# Patient Record
Sex: MALE | Race: WHITE
[De-identification: names, ages, dates, MRNs, and addresses within clinical notes are randomized per-mention and may not be internally consistent; named-entity substitution may affect disease eponyms.]

---

## 2018-02-28 ENCOUNTER — HOSPITAL ENCOUNTER (EMERGENCY)
Dept: HOSPITAL 17 - NEPD | Age: 23
Discharge: HOME | End: 2018-02-28
Payer: COMMERCIAL

## 2018-02-28 VITALS
DIASTOLIC BLOOD PRESSURE: 78 MMHG | RESPIRATION RATE: 16 BRPM | OXYGEN SATURATION: 100 % | SYSTOLIC BLOOD PRESSURE: 131 MMHG | HEART RATE: 85 BPM

## 2018-02-28 VITALS
DIASTOLIC BLOOD PRESSURE: 71 MMHG | TEMPERATURE: 98.9 F | HEART RATE: 72 BPM | SYSTOLIC BLOOD PRESSURE: 135 MMHG | OXYGEN SATURATION: 100 % | RESPIRATION RATE: 18 BRPM

## 2018-02-28 VITALS — WEIGHT: 220.46 LBS | BODY MASS INDEX: 34.6 KG/M2 | HEIGHT: 67 IN

## 2018-02-28 VITALS — OXYGEN SATURATION: 97 %

## 2018-02-28 DIAGNOSIS — R55: Primary | ICD-10-CM

## 2018-02-28 LAB
ALBUMIN SERPL-MCNC: 4 GM/DL (ref 3.4–5)
ALP SERPL-CCNC: 73 U/L (ref 45–117)
ALT SERPL-CCNC: 40 U/L (ref 9–52)
APAP SERPL-MCNC: (no result) MCG/ML (ref 10–30)
AST SERPL-CCNC: 30 U/L (ref 15–39)
BASOPHILS # BLD AUTO: 0 TH/MM3 (ref 0–0.2)
BASOPHILS NFR BLD: 0.1 % (ref 0–2)
BILIRUB SERPL-MCNC: 0.5 MG/DL (ref 0.2–1)
BUN SERPL-MCNC: 15 MG/DL (ref 7–18)
CALCIUM SERPL-MCNC: 9.4 MG/DL (ref 8.5–10.1)
CHLORIDE SERPL-SCNC: 107 MEQ/L (ref 98–107)
CREAT SERPL-MCNC: 1.11 MG/DL (ref 0.6–1.3)
EOSINOPHIL # BLD: 0.1 TH/MM3 (ref 0–0.4)
EOSINOPHIL NFR BLD: 0.6 % (ref 0–4)
ERYTHROCYTE [DISTWIDTH] IN BLOOD BY AUTOMATED COUNT: 12.7 % (ref 11.6–17.2)
GFR SERPLBLD BASED ON 1.73 SQ M-ARVRAT: 84 ML/MIN (ref 89–?)
GLUCOSE SERPL-MCNC: 91 MG/DL (ref 74–106)
HCO3 BLD-SCNC: 25.1 MEQ/L (ref 21–32)
HCT VFR BLD CALC: 44.8 % (ref 39–51)
HGB BLD-MCNC: 16 GM/DL (ref 13–17)
LYMPHOCYTES # BLD AUTO: 1.2 TH/MM3 (ref 1–4.8)
LYMPHOCYTES NFR BLD AUTO: 13.8 % (ref 9–44)
MCH RBC QN AUTO: 31.5 PG (ref 27–34)
MCHC RBC AUTO-ENTMCNC: 35.7 % (ref 32–36)
MCV RBC AUTO: 88.4 FL (ref 80–100)
MONOCYTE #: 0.4 TH/MM3 (ref 0–0.9)
MONOCYTES NFR BLD: 4.6 % (ref 0–8)
NEUTROPHILS # BLD AUTO: 7.1 TH/MM3 (ref 1.8–7.7)
NEUTROPHILS NFR BLD AUTO: 80.9 % (ref 16–70)
PLATELET # BLD: 208 TH/MM3 (ref 150–450)
PMV BLD AUTO: 8.1 FL (ref 7–11)
PROT SERPL-MCNC: 7.4 GM/DL (ref 6.4–8.2)
RBC # BLD AUTO: 5.07 MIL/MM3 (ref 4.5–5.9)
SODIUM SERPL-SCNC: 140 MEQ/L (ref 136–145)
TROPONIN I SERPL-MCNC: (no result) NG/ML (ref 0.02–0.05)
WBC # BLD AUTO: 8.8 TH/MM3 (ref 4–11)

## 2018-02-28 PROCEDURE — 84484 ASSAY OF TROPONIN QUANT: CPT

## 2018-02-28 PROCEDURE — 85025 COMPLETE CBC W/AUTO DIFF WBC: CPT

## 2018-02-28 PROCEDURE — 83880 ASSAY OF NATRIURETIC PEPTIDE: CPT

## 2018-02-28 PROCEDURE — 82550 ASSAY OF CK (CPK): CPT

## 2018-02-28 PROCEDURE — 70450 CT HEAD/BRAIN W/O DYE: CPT

## 2018-02-28 PROCEDURE — 80053 COMPREHEN METABOLIC PANEL: CPT

## 2018-02-28 PROCEDURE — 72131 CT LUMBAR SPINE W/O DYE: CPT

## 2018-02-28 PROCEDURE — 71045 X-RAY EXAM CHEST 1 VIEW: CPT

## 2018-02-28 PROCEDURE — 83690 ASSAY OF LIPASE: CPT

## 2018-02-28 PROCEDURE — 72125 CT NECK SPINE W/O DYE: CPT

## 2018-02-28 PROCEDURE — 80307 DRUG TEST PRSMV CHEM ANLYZR: CPT

## 2018-02-28 PROCEDURE — 82552 ASSAY OF CPK IN BLOOD: CPT

## 2018-02-28 PROCEDURE — 72128 CT CHEST SPINE W/O DYE: CPT

## 2018-02-28 PROCEDURE — 84443 ASSAY THYROID STIM HORMONE: CPT

## 2018-02-28 NOTE — RADRPT
EXAM DATE/TIME:  02/28/2018 11:29 

 

[facility] COMPARISON:     

No previous studies available for comparison.

 

 

INDICATIONS :     

Fall today,general weakness

                      

 

RADIATION DOSE:     

35.94 CTDIvol (mGy) 

 

 

 

MEDICAL HISTORY :     

None  

 

SURGICAL HISTORY :      

None. 

 

ENCOUNTER:      

Initial

 

ACUITY:      

1 day

 

PAIN SCALE:      

0/10

 

LOCATION:        

neck 

 

TECHNIQUE:     

Volumetric scanning of the cervical spine was performed. Multiplanar reconstructions in the sagittal,
 coronal and oblique axial planes were performed.   Using automated exposure control and adjustment o
f the mA and/or kV according to patient size, radiation dose was kept as low as reasonably achievable
 to obtain optimal diagnostic quality images.   DICOM format image data is available electronically f
or review and comparison.  

 

FINDINGS:     

 

VERTEBRAE:     

Normal vertebral body height.

 

ALIGNMENT:     

No evidence of subluxation.

 

C2-C3:  

The bony spinal canal is normal in size.  No evidence of disc bulge or herniation.  The neural forami
na are bilaterally patent.

 

C3-C4:  

The bony spinal canal is normal in size.  No evidence of disc bulge or herniation.  The neural forami
na are bilaterally patent.

 

C4-C5:  

The bony spinal canal is normal in size.  No evidence of disc bulge or herniation.  The neural forami
na are bilaterally patent.

 

C5-C6:  

The bony spinal canal is normal in size.  No evidence of disc bulge or herniation.  The neural forami
na are bilaterally patent.

 

C6-C7:  

The bony spinal canal is normal in size.  No evidence of disc bulge or herniation.  The neural forami
na are bilaterally patent.

 

C7-T1:  

The bony spinal canal is normal in size.  No evidence of disc bulge or herniation.  The neural forami
na are bilaterally patent.

 

CONCLUSION:     

Normal examination.  

 

 

 

 Will Mckeon MD on February 28, 2018 at 12:06           

Board Certified Radiologist.

 This report was verified electronically.

## 2018-02-28 NOTE — RADRPT
EXAM DATE/TIME:  02/28/2018 11:33 

 

[facility] COMPARISON:     

No previous studies available for comparison.

 

 

INDICATIONS :     

Fall today general weakness

                      

 

RADIATION DOSE:     

23.74 CTDIvol (mGy) ; Combined studies

 

 

 

MEDICAL HISTORY :     

None  

 

SURGICAL HISTORY :      

None. 

 

ENCOUNTER:      

Initial

 

ACUITY:      

1 day

 

PAIN SCALE:      

0/10

 

LOCATION:         

t [facility]

 

TECHNIQUE:     

Volumetric scanning of the thoracic spine was performed.  Multiplanar reconstructions in the sagittal
, coronal and oblique axial planes were performed.  Using automated exposure control and adjustment o
f the mA and/or kV according to patient size, radiation dose was kept as low as reasonably achievable
 to obtain optimal diagnostic quality images.   DICOM format image data is available electronically f
or review and comparison.  

 

FINDINGS:     

The vertebral bodies of the thoracic spine are in normal alignment without evidence of subluxation.  


Vertebral body height is maintained.  No fractures are seen.

 

T1-T2:   

Normal.

 

T2-T3:   

The thecal sac has a normal diameter.  No evidence of disc bulge or protrusion.

 

T3-T4:   

The thecal sac has a normal diameter.  No evidence of disc bulge or protrusion.

 

T4-T5:   

The thecal sac has a normal diameter.  No evidence of disc bulge or protrusion.

 

T5-T6:   

The thecal sac has a normal diameter.  No evidence of disc bulge or protrusion.

 

T6-T7:   

The thecal sac has a normal diameter.  No evidence of disc bulge or protrusion.

 

T7-T8:   

The thecal sac has a normal diameter.  No evidence of disc bulge or protrusion.

 

T8-T9:   

The thecal sac has a normal diameter.  No evidence of disc bulge or protrusion.

 

T9-T10:  

The thecal sac has a normal diameter.  No evidence of disc bulge or protrusion.

 

T10-T11:  

The thecal sac has a normal diameter.  No evidence of disc bulge or protrusion.

 

T11-T12:  

The thecal sac has a normal diameter.  No evidence of disc bulge or protrusion.

 

T12-L1:  

The thecal sac has a normal diameter.  No evidence of disc bulge or protrusion.

 

CONCLUSION:     

Normal examination.  

 

 

 

 Elie Márquez MD on February 28, 2018 at 12:06           

Board Certified Radiologist.

 This report was verified electronically.

## 2018-02-28 NOTE — RADRPT
EXAM DATE/TIME:  02/28/2018 11:29 

 

[facility] COMPARISON:     No previous studies available for comparison.

 

INDICATIONS :     Fall today 

                      

RADIATION DOSE:     56.35 CTDIvol (mGy) 

 

 

MEDICAL HISTORY :     None  

SURGICAL HISTORY :      None. 

ENCOUNTER:      Initial

ACUITY:      1 day

PAIN SCALE:      0/10

LOCATION:        cranial 

 

TECHNIQUE:     Multiple contiguous axial images were obtained of the head.  Using automated exposure 
control and adjustment of the mA and/or kV according to patient size, radiation dose was kept as low 
as reasonably achievable to obtain optimal diagnostic quality images.   DICOM format image data is av
ailable electronically for review and comparison.  

 

FINDINGS:     

CEREBRUM:     The ventricles are normal for age.  No evidence of midline shift, mass lesion, hemorrha
ge or acute infarction.  No extra-axial fluid collections are seen.

POSTERIOR FOSSA:     The cerebellum and brainstem are intact.  The 4th ventricle is midline.  The cer
ebellopontine angle is unremarkable.

EXTRACRANIAL:     The visualized portion of the orbits is intact.

SKULL:     The calvaria is intact.  No evidence of skull fracture.

 

CONCLUSION:     Negative for acute process.  

 

 Jacob Guerrero MD FACR on February 28, 2018 at 11:38           

Board Certified Radiologist.

 This report was verified electronically.

## 2018-02-28 NOTE — RADRPT
EXAM DATE/TIME:  02/28/2018 11:09 

 

[facility] COMPARISON:     

No previous studies available for comparison.

 

                     

INDICATIONS :     

S/p fall today at work

                     

 

MEDICAL HISTORY :     

None.          

 

SURGICAL HISTORY :     

None.   

 

ENCOUNTER:     

Initial                                        

 

ACUITY:     

1 day      

 

PAIN SCORE:     

0/10

 

LOCATION:     

Bilateral chest 

 

FINDINGS:     

A single view of the chest demonstrates the lungs to be symmetrically aerated without evidence of mas
s, infiltrate or effusion.  The cardiomediastinal contours are unremarkable.  Osseous structures are 
intact.

 

CONCLUSION:     No acute disease.  

 

 

 

 Will Mckeon MD on February 28, 2018 at 11:59           

Board Certified Radiologist.

 This report was verified electronically.

## 2018-02-28 NOTE — RADRPT
EXAM DATE/TIME:  02/28/2018 11:33 

 

[facility] COMPARISON:     

No previous studies available for comparison.

 

 

INDICATIONS :     

Fall today general weakness

                      

 

RADIATION DOSE:     

23.74 CTDIvol (mGy) ; Combined studies

 

 

 

MEDICAL HISTORY :     

None  

 

SURGICAL HISTORY :      

None. 

 

ENCOUNTER:      

Initial

 

ACUITY:      

1 day

 

PAIN SCALE:      

0/10

 

LOCATION:         

Lumbar

 

TECHNIQUE:     

Volumetric scanning of the lumbar spine was performed.  Multiplanar reconstructions in the sagittal, 
coronal and oblique axial planes were performed.  Using automated exposure control and adjustment of 
the mA and/or kV according to patient size, radiation dose was kept as low as reasonably achievable t
o obtain optimal diagnostic quality images.   DICOM format image data is available electronically for
 review and comparison.  

 

FINDINGS:       

 

VERTEBRAE:     

Normal vertebral body height.

 

ALIGNMENT:     

No evidence of subluxation.

 

 

T12-L1:  

The thecal sac has a normal diameter.  No evidence of disc bulge or protrusion.  The neural foramina 
are patent bilaterally.

 

L1-L2:    

The thecal sac has a normal diameter.  No evidence of disc bulge or protrusion.  The neural foramina 
are patent bilaterally.

 

L2-L3:    

The thecal sac has a normal diameter.  No evidence of disc bulge or protrusion.  The neural foramina 
are patent bilaterally.

 

L3-L4:    

The thecal sac has a normal diameter.  No evidence of disc bulge or protrusion.  The neural foramina 
are patent bilaterally.

 

L4-L5:    

The thecal sac has a normal diameter.  No evidence of disc bulge or protrusion.  The neural foramina 
are patent bilaterally.

 

L5-S1:    

The thecal sac has a normal diameter.  No evidence of disc bulge or protrusion.  The neural foramina 
are patent bilaterally.

 

CONCLUSION:     

No acute disease.  

 

 

 

 Will Mckeon MD on February 28, 2018 at 12:16           

Board Certified Radiologist.

 This report was verified electronically.

## 2018-02-28 NOTE — PD
HPI


Chief Complaint:  Fall


Time Seen by Provider:  11:00


Travel History


International Travel<30 days:  No


Contact w/Intl Traveler<30days:  No


Traveled to known affect area:  No





History of Present Illness


HPI


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 

numbness to all extremities and nearly passing out.  patient stated initial 

pain was severe, sharp, 10/10, and radiated up his spine......  patient denies 

any alleviating or aggravating factors





nkda


denies pshx 


pmhx c/w back and knee pain as well as anxiety attack





PFSH


Past Medical History


Anxiety:  Yes


Tetanus Vaccination:  Unknown





Past Surgical History


Surgical History:  No Previous Surgery





Social History


Alcohol Use:  No


Tobacco Use:  No


Substance Use:  No





Allergies-Medications


(Allergen,Severity, Reaction):  


Coded Allergies:  


     No Known Allergies (Unverified , 2/28/18)


Reported Meds & Prescriptions





Reported Meds & Active Scripts


Active


No Active Prescriptions or Reported Medications    








Review of Systems


General / Constitutional:  No: Fever


Eyes:  No: Visual changes


HENT:  No: Headaches


Cardiovascular:  No: Chest Pain or Discomfort


Respiratory:  No: Shortness of Breath


Gastrointestinal:  No: Abdominal Pain


Genitourinary:  No: Dysuria


Musculoskeletal:  Positive: Pain


Skin:  No Rash


Neurologic:  No: Weakness


Psychiatric:  No: Depression


Endocrine:  No: Polydipsia


Hematologic/Lymphatic:  No: Easy Bruising





Physical Exam


Narrative


GENERAL: patient arrived shedpacked and backboarded.  backboard cleared and 

upon palpation no midline ttp along mid back.


SKIN: Warm and dry.


HEAD: Atraumatic. Normocephalic. 


EYES: Pupils equal and round. No scleral icterus. No injection or drainage. 


ENT: No nasal bleeding or discharge.  Mucous membranes pink and moist.


NECK: Trachea midline. No JVD. 


CARDIOVASCULAR: Regular rate and rhythm.  


RESPIRATORY: No accessory muscle use. Clear to auscultation. Breath sounds 

equal bilaterally. 


GASTROINTESTINAL: Abdomen soft, non-tender, nondistended. 


MUSCULOSKELETAL: Extremities without clubbing, cyanosis, or edema. No obvious 

deformities. 


NEUROLOGICAL: Awake and alert. No obvious cranial nerve deficits.  Motor 

grossly within normal limits. Five out of 5 muscle strength in the arms and 

legs.  Normal speech.


PSYCHIATRIC: Appropriate mood and affect; insight and judgment normal.





Data


Data


Last Documented VS





Orders





 Orders


Complete Blood Count With Diff (2/28/18 11:00)


Comprehensive Metabolic Panel (2/28/18 11:00)


Creatine Kinase (Cpk) (2/28/18 11:00)


Troponin I (2/28/18 11:00)


B-Type Natriuretic Peptide (2/28/18 11:00)


Lipase (2/28/18 11:00)


Thyroid Stimulating Hormone (2/28/18 11:00)


Chest, Single Ap (2/28/18 11:00)


Ct Brain W/O Iv Contrast(Rout) (2/28/18 11:00)


Iv Access Insert/Monitor (2/28/18 11:00)


Ecg Monitoring (2/28/18 11:00)


Oximetry (2/28/18 11:00)


Drug Screen, Random Urine (2/28/18 11:00)


Alcohol (Ethanol) (2/28/18 11:00)


Salicylates (Aspirin) (2/28/18 11:00)


Tylenol (Acetaminophen) (2/28/18 11:00)


Ct Cerv Spine W/O Contrast (2/28/18 11:00)


Ct Thor Spine W/O Contrast (2/28/18 11:00)


Ct Lumb Spine W/O Contrast (2/28/18 11:00)


CKMB (2/28/18 11:20)


CKMB% (2/28/18 11:20)


Ed Discharge Order (2/28/18 13:19)





Labs





Laboratory Tests








Test


  2/28/18


11:20 2/28/18


12:45


 


White Blood Count 8.8 TH/MM3  


 


Red Blood Count 5.07 MIL/MM3  


 


Hemoglobin 16.0 GM/DL  


 


Hematocrit 44.8 %  


 


Mean Corpuscular Volume 88.4 FL  


 


Mean Corpuscular Hemoglobin 31.5 PG  


 


Mean Corpuscular Hemoglobin


Concent 35.7 % 


  


 


 


Red Cell Distribution Width 12.7 %  


 


Platelet Count 208 TH/MM3  


 


Mean Platelet Volume 8.1 FL  


 


Neutrophils (%) (Auto) 80.9 %  


 


Lymphocytes (%) (Auto) 13.8 %  


 


Monocytes (%) (Auto) 4.6 %  


 


Eosinophils (%) (Auto) 0.6 %  


 


Basophils (%) (Auto) 0.1 %  


 


Neutrophils # (Auto) 7.1 TH/MM3  


 


Lymphocytes # (Auto) 1.2 TH/MM3  


 


Monocytes # (Auto) 0.4 TH/MM3  


 


Eosinophils # (Auto) 0.1 TH/MM3  


 


Basophils # (Auto) 0.0 TH/MM3  


 


CBC Comment DIFF FINAL  


 


Differential Comment   


 


Blood Urea Nitrogen 15 MG/DL  


 


Creatinine 1.11 MG/DL  


 


Random Glucose 91 MG/DL  


 


Total Protein 7.4 GM/DL  


 


Albumin 4.0 GM/DL  


 


Calcium Level 9.4 MG/DL  


 


Alkaline Phosphatase 73 U/L  


 


Aspartate Amino Transf


(AST/SGOT) 30 U/L 


  


 


 


Alanine Aminotransferase


(ALT/SGPT) 40 U/L 


  


 


 


Total Bilirubin 0.5 MG/DL  


 


Sodium Level 140 MEQ/L  


 


Potassium Level 3.4 MEQ/L  


 


Chloride Level 107 MEQ/L  


 


Carbon Dioxide Level 25.1 MEQ/L  


 


Anion Gap 8 MEQ/L  


 


Estimat Glomerular Filtration


Rate 84 ML/MIN 


  


 


 


Total Creatine Kinase 405 U/L  


 


Creatine Kinase MB 2.4 NG/ML  


 


Creatine Kinase MB % 0.6 %  


 


Troponin I


  LESS THAN 0.02


NG/ML 


 


 


B-Type Natriuretic Peptide


  LESS THAN 2


PG/ML 


 


 


Lipase 138 U/L  


 


Thyroid Stimulating Hormone


3rd Gen 2.940 uIU/ML 


  


 


 


Salicylates Level


  LESS THAN 1.7


MG/DL 


 


 


Acetaminophen Level


  LESS THAN 2.0


MCG/ML 


 


 


Ethyl Alcohol Level


  LESS THAN 3


MG/DL 


 


 


Urine Opiates Screen  NEG 


 


Urine Barbiturates Screen  NEG 


 


Urine Amphetamines Screen  NEG 


 


Urine Benzodiazepines Screen  NEG 


 


Urine Cocaine Screen  NEG 


 


Urine Cannabinoids Screen  NEG 











MDM


Medical Decision Making


Medical Screen Exam Complete:  Yes


Emergency Medical Condition:  Yes


Medical Record Reviewed:  Yes


Differential Diagnosis


cauda equina v conus v back spasm v disc protrusion v syncope


Narrative Course


ct cervical spine read as no abnormality by radiologist





ct head neg for ich/mass per radiologist





ct thoracic/lumbar spine no disc bulging, no herniations, read as no 

abnormality by radiologist





chest xray without abnormality per radiologist





cbc shows no leukocytosis/anemia/left shift or platelet abnormality





cmp shows normal electrolytes, normal liver/kidney/pancreas function.  negative 

troponin, and normal thyroid. neg salicylates/tylenol or alcohol and remainder 

of tox screen negative





nearsyncope secondary to vasovagal effect due to severe pain.





Diagnosis





 Primary Impression:  


 nearsyncope


Patient Instructions:  General Instructions, Near Syncope (ED)


Scripts


No Active Prescriptions or Reported Meds


Disposition:  01 DISCHARGE HOME


Condition:  Stable











North Sanchez MD Feb 28, 2018 11:10